# Patient Record
Sex: FEMALE | Race: AMERICAN INDIAN OR ALASKA NATIVE | NOT HISPANIC OR LATINO
[De-identification: names, ages, dates, MRNs, and addresses within clinical notes are randomized per-mention and may not be internally consistent; named-entity substitution may affect disease eponyms.]

---

## 2021-03-31 ENCOUNTER — APPOINTMENT (OUTPATIENT)
Dept: SURGICAL ONCOLOGY | Facility: CLINIC | Age: 51
End: 2021-03-31
Payer: MEDICARE

## 2021-03-31 VITALS
WEIGHT: 139 LBS | HEART RATE: 86 BPM | SYSTOLIC BLOOD PRESSURE: 204 MMHG | DIASTOLIC BLOOD PRESSURE: 122 MMHG | HEIGHT: 60 IN | OXYGEN SATURATION: 98 % | BODY MASS INDEX: 27.29 KG/M2 | RESPIRATION RATE: 16 BRPM

## 2021-03-31 DIAGNOSIS — N64.59 OTHER SIGNS AND SYMPTOMS IN BREAST: ICD-10-CM

## 2021-03-31 PROCEDURE — 99072 ADDL SUPL MATRL&STAF TM PHE: CPT

## 2021-03-31 PROCEDURE — 99205 OFFICE O/P NEW HI 60 MIN: CPT

## 2021-03-31 RX ORDER — LATANOPROST/PF 0.005 %
0.01 DROPS OPHTHALMIC (EYE)
Qty: 5 | Refills: 0 | Status: ACTIVE | COMMUNITY
Start: 2021-02-10

## 2021-03-31 RX ORDER — MECLIZINE HYDROCHLORIDE 25 MG/1
25 TABLET ORAL
Qty: 90 | Refills: 0 | Status: ACTIVE | COMMUNITY
Start: 2021-02-10

## 2021-03-31 RX ORDER — ATORVASTATIN CALCIUM 20 MG/1
20 TABLET, FILM COATED ORAL
Qty: 30 | Refills: 0 | Status: ACTIVE | COMMUNITY
Start: 2021-03-09

## 2021-03-31 RX ORDER — CYCLOBENZAPRINE HYDROCHLORIDE 10 MG/1
10 TABLET, FILM COATED ORAL
Qty: 30 | Refills: 0 | Status: ACTIVE | COMMUNITY
Start: 2020-12-16

## 2021-03-31 RX ORDER — METOPROLOL SUCCINATE 50 MG/1
50 TABLET, EXTENDED RELEASE ORAL
Qty: 30 | Refills: 0 | Status: ACTIVE | COMMUNITY
Start: 2020-10-09

## 2021-06-15 NOTE — HISTORY OF PRESENT ILLNESS
[de-identified] : Last seen 2014 for annual breast examination.\par No follow-up since.\par \par 50-year-old lady referred by her internist (Justin SADLER) for breast examination.\par \par Today, she reports bilateral, diffuse, fairly significant mastalgia.\par Her symptoms are fairly constant.\par No provocative or palliative factors.\par \par No other signs or symptoms related to either breast.\par \par When seen initially, she had reported that since at least the summer  she has had:\par Bilateral, multiduct, spontaneous, nonbloody nipple discharge.\par She has had accompanying, poorly localized, intermittent (noncyclical) bilateral mastalgia.\par \par My examination, and subsequent imaging, identified no worrisome findings.\par \par \par \par CC (presently):\par Since 2021:\par Bilateral breast lumps, and significant, diffuse, bilateral breast pain\par \par No preceding injury or strain.\par No other signs or symptoms related to either breast.\par \par Possible contributing factors for her mastalgia:\par At least 4 caffeinated beverages daily.\par Non-smoker.\par No exogenous hormones.\par \par 3/10/2021:\par Bilateral mammogram and sonogram at [facility]: BI-RADS 0.\par Bilateral targeted breast ultrasound (upper palpable masses) recommended.\par She has not gone for the studies yet.\par Prescription provided.\par \par \par +Personal history:\par 2004:\par Excisional left benign breast biopsy.\par No other procedures related to either breast.\par \par + FH:\par Maternal aunt: Breast cancer at 48.\par A maternal cousin also had breast cancer.\par \par Maternal aunt with ovarian cancer in her 40s.\par A maternal great aunt also had ovarian cancer.\par \par She has some distant relatives with "stomach cancer".\par \par Maternal cousin with leukemia.\par \par \par Menarche at 10.\par  3, para 1, first at 23.\par \par \par Her breast cancer risk score is 25.2.\par \par \par PMD: Dr. Justin SADLER.\par \par No pacemaker or defibrillator.\par No anticoagulants.\par \par + Hypertension, treated with metoprolol.\par Atorvastatin for hyperlipidemia..\par She has not had to see a cardiologist\par \par + GERD.\par Symptoms treated with ranitidine.\par Sodium docusate for constipation.\par \par + Vertigo, symptoms treated with meclizine.\par + "Numbness" treated with methocarbamol.\par \par Glaucoma for which she uses Lumigan eyedrops.\par Ophthalmology: Dr. Jas CATHERINE\par \par \par She has an appointment with her gynecologist in 2021.\par She does not have a specific name.\par She goes to the [facility] in [place].\par \par \par  colonoscopy at [facility] was unremarkable.

## 2021-06-15 NOTE — ASSESSMENT
[FreeTextEntry1] : Regarding her breast pain and tenderness:\par I have made some suggestions for conservative efforts for symptom relief.\par I have urged her to to go for the recommended bilateral breast ultrasounds, and gave her a prescription for [facility].\par I also suggested a baseline breast MRI (breast cancer risk score = 25.2).\par She understands and agrees, prescription provided for the same facility.\par \par 3/10/2021:\par Bilateral mammogram and sonogram at [facility]: BI-RADS 0.\par Bilateral targeted breast ultrasound (upper palpable masses) recommended.\par \par \par Regarding her recent weight loss, fatigue, and malaise.\par I suggested:\par Follow-up with her internist, who she had seen last week for a baseline evaluation.\par I also suggested she mention her symptoms to her gynecologist when she goes in April 2021.\par \par Reviewed in detail, all questions answered.\par \par I have asked to see her in 4 to 6 months, sooner if needed.\par \par Note dictated\par \par \par 5/28/2021:\par Baseline bilateral breast MRI at [facility]: BI-RADS 3.\par 6-month follow-up recommended for November 2021.\par Prescription mailed 6-7-21.\par \par Regarding the recommended supplemental bilateral breast ultrasounds....................\par \par \par 6/15/2021:\par I returned her call.\par No answer.\par Mailbox full.

## 2021-06-15 NOTE — REVIEW OF SYSTEMS
[Negative] : Heme/Lymph [FreeTextEntry3] : Glaucoma [FreeTextEntry4] : Dizziness [FreeTextEntry5] : Hypertension [de-identified] : Numbness [de-identified] : Nipple discharge

## 2021-06-15 NOTE — REASON FOR VISIT
[Initial Consultation] : an initial consultation for [Other: _____] : [unfilled] [FreeTextEntry2] : Bilateral mastalgia

## 2021-06-15 NOTE — PHYSICAL EXAM
[Normal] : supple, no neck mass and thyroid not enlarged [Normal Neck Lymph Nodes] : normal neck lymph nodes  [Normal Supraclavicular Lymph Nodes] : normal supraclavicular lymph nodes [Normal Axillary Lymph Nodes] : normal axillary lymph nodes [Normal] : normal appearance, no rash, nodules, vesicles, ulcers, erythema [de-identified] : Below [de-identified] : Groins not examined

## 2021-09-26 PROBLEM — N64.4 MASTALGIA: Status: ACTIVE | Noted: 2021-09-26

## 2021-09-27 ENCOUNTER — APPOINTMENT (OUTPATIENT)
Dept: SURGICAL ONCOLOGY | Facility: CLINIC | Age: 51
End: 2021-09-27

## 2021-09-27 DIAGNOSIS — N64.4 MASTODYNIA: ICD-10-CM

## 2023-01-09 NOTE — ASSESSMENT
[FreeTextEntry1] : 9/27/2021, she did not keep her appointment for follow-up of bilateral mastalgia\par \par \par (Regarding her breast pain and tenderness:\par I made some suggestions for conservative efforts for symptom relief.\par I urged her to to go for the recommended bilateral breast ultrasounds, and gave her a prescription for [facility].\par I also suggested a baseline breast MRI (breast cancer risk score = 25.2).\par She understands and agrees, prescription provided for the same facility.\par \par 3/10/2021:\par Bilateral mammogram and sonogram at [facility]: BI-RADS 0.\par Bilateral targeted breast ultrasound (upper palpable masses) recommended.\par \par \par \par 5/28/2021:\par Baseline bilateral breast MRI at [facility]: BI-RADS 3.\par 6-month follow-up recommended for November 2021.\par Prescription mailed 6-7-21.\par \par \par 12/19/2022.\par I called.  I spoke.\par December 8, 2022, she had the following procedures, and results, from [facility]:\par 1.  Left breast (12:00) sonogram guided core needle biopsy:\par Benign and concordant.\par 6-month follow-up left breast ultrasound recommended.\par 2.  Right breast (retroareolar) MRI core biopsy:\par Benign and concordant.\par 6-month follow-up MRI recommended.\par \par Jackie will send her prescriptions for the procedures for June 2023. \par \par Regarding her recent weight loss, fatigue, and malaise.\par I suggested:\par Follow-up with her internist, who she had seen last week for a baseline evaluation.\par I also suggested she mention her symptoms to her gynecologist when she goes in April 2021.\par \par \par Regarding the recommended supplemental bilateral breast ultrasounds....................) \par \par \par \par 11/22/2022.\par We spoke.\par Earlier this month she had her bilateral mammogram and sonogram at [facility]: BI-RADS 0.\par MRI recommended (6-month follow-up).\par Subsequent bilateral breast MRI demonstrates:\par 1.  A right breast retroareolar nodule for which an MRI core biopsy is recommended.\par 2.  Left breast (upper) abnormality for which a sonogram guided core biopsy is recommended.\par \par Jackie will send her prescriptions.\par When the patient receives the paperwork, she will call [facility] to schedule her procedures.\par \par ***ALSO, she reports a 3-month history of intermittent, asymptomatic, sometimes spontaneous, other times only on exam, bilateral bloody appearing nipple discharge.\par \par I will see her for a follow-up visit regarding this interval change in physical exam.\par \par \par 12/8/2022:\par Right breast MRI core biopsy at [facility]:\par Fibrocystic change with calcifications.\par \par Left breast (12:00) sonogram guided core biopsy at [facility]:\par Fibroadenoma.\par \par Both benign results are concordant.\par 6-month follow-up breast MRI recommended for June 2023.\par Prescription mailed to her on 01/09/2023.\par

## 2023-01-09 NOTE — HISTORY OF PRESENT ILLNESS
[de-identified] : 50-year-old lady returning for scheduled follow-up of bilateral mastalgia.\par \par Index visit was 2021.\par Her symptoms were constant, bilateral, diffuse, and fairly significant.\par \par My PE:\par Bilateral tenderness.\par No other discrete visible or palpable findings in either breast.\par \par + Elevated breast cancer risk score (25.2, below).\par \par May 2021:\par Baseline breast MRI at [facility]: BI-RADS 3.\par I had mailed her a prescription for her 6-month follow-up MRI 2021 on 2021.\par \par She returns for scheduled follow-up today.\par \par \par 2021, she was referred by her internist (Justin SADLER) for breast examination.\par \par She had bilateral, diffuse, fairly significant mastalgia.\par Her symptoms were fairly constant.\par No provocative or palliative factors.\par \par No other signs or symptoms related to either breast.\par \par \par She was seen initially in May 2005.\par At that time she reported that since the summer 2005 she had:\par Bilateral, multi-duct, spontaneous, nonbloody nipple discharge.\par She had accompanying, poorly localized, intermittent (non-cyclical) bilateral mastalgia.\par \par My examination, and subsequent imaging, identified no worrisome findings.\par \par \par \par 2021:\par Since 2021:\par Bilateral breast lumps, and significant, diffuse, bilateral breast pain\par \par No preceding injury or strain.\par No other signs or symptoms related to either breast.\par \par Possible contributing factors for her mastalgia:\par At least 4 caffeinated beverages daily.\par Non-smoker.\par No exogenous hormones.\par \par 3/10/2021:\par Bilateral mammogram and sonogram at [facility]: BI-RADS 0.\par Bilateral targeted breast ultrasound (upper palpable masses) recommended.\par At her 2021 visit I provided her with prescription for follow-up studies...............\par \par \par +Personal history:\par 2004:\par Excisional left benign breast biopsy.\par No other procedures related to either breast.\par \par + FH:\par Maternal aunt: Breast cancer at 48.\par A maternal cousin also had breast cancer.\par \par Maternal aunt with ovarian cancer in her 40s.\par A maternal great aunt also had ovarian cancer.\par \par She has some distant relatives with "stomach cancer".\par \par Maternal cousin with leukemia.\par \par \par Menarche at 10.\par  3, para 1, first at 23.\par \par \par Her breast cancer risk score is 25.2.\par \par \par PMD: Dr. Justin SADLER.\par \par No pacemaker or defibrillator.\par No anticoagulants.\par \par + Hypertension, treated with metoprolol.\par Atorvastatin for hyperlipidemia..\par She has not had to see a cardiologist\par \par + GERD.\par Symptoms treated with ranitidine.\par Sodium docusate for constipation.\par \par + Vertigo, symptoms treated with meclizine.\par + "Numbness" treated with methocarbamol.\par \par Glaucoma for which she uses Lumigan eyedrops.\par Ophthalmology: Dr. Jas CATHERINE\par \par \par She has an appointment with her gynecologist in 2021.\par She does not have a specific name.\par She goes to the [facility] in [place].\par \par \par  colonoscopy at [facility] was unremarkable.

## 2023-01-09 NOTE — REASON FOR VISIT
[Other: _____] : [unfilled] [Melanoma] : melanoma [FreeTextEntry2] : 9/27/2021, she did not keep her appointment for follow-up of bilateral mastalgia

## 2023-01-09 NOTE — REVIEW OF SYSTEMS
[Negative] : Integumentary [FreeTextEntry3] : Glaucoma [FreeTextEntry5] : Hypertension [FreeTextEntry8] : Heartburn [de-identified] : Vertigo [FreeTextEntry1] : Increased risk of breast cancer

## 2023-01-09 NOTE — PHYSICAL EXAM
[Normal] : supple, no neck mass and thyroid not enlarged [Normal Neck Lymph Nodes] : normal neck lymph nodes  [Normal Supraclavicular Lymph Nodes] : normal supraclavicular lymph nodes [Normal Axillary Lymph Nodes] : normal axillary lymph nodes [Normal] : normal appearance, no rash, nodules, vesicles, ulcers, erythema [de-identified] : Groins not examined [de-identified] : Below

## 2024-10-03 ENCOUNTER — APPOINTMENT (OUTPATIENT)
Dept: SURGICAL ONCOLOGY | Facility: CLINIC | Age: 54
End: 2024-10-03

## 2024-10-03 DIAGNOSIS — Z91.89 OTHER SPECIFIED PERSONAL RISK FACTORS, NOT ELSEWHERE CLASSIFIED: ICD-10-CM

## 2024-10-03 NOTE — REVIEW OF SYSTEMS
[Negative] : Integumentary [FreeTextEntry3] : Glaucoma [FreeTextEntry4] : Vertigo [FreeTextEntry5] : Hypertension [FreeTextEntry8] : Reflux [de-identified] : Nipple discharge and mastalgia [FreeTextEntry1] : Increased risk of breast cancer

## 2024-10-03 NOTE — PHYSICAL EXAM
[Normal] : supple, no neck mass and thyroid not enlarged [Normal Neck Lymph Nodes] : normal neck lymph nodes  [Normal Supraclavicular Lymph Nodes] : normal supraclavicular lymph nodes [Normal Axillary Lymph Nodes] : normal axillary lymph nodes [Normal] : normal appearance, no rash, nodules, vesicles, ulcers, erythema [de-identified] : Groins not examined [de-identified] : Below

## 2024-10-03 NOTE — ASSESSMENT
[FreeTextEntry1] : 53-year-old lady.  Presents to reestablish regular breast examinations.  Breast cancer risk score = 25.2.   Breast imagin2024: Bilateral breast MRI at [facility]-: BI-RADS 0. Bilateral diagnostic mammogram and ultrasound recommended. I had mailed her a prescription on 2024.

## 2024-10-03 NOTE — HISTORY OF PRESENT ILLNESS
[de-identified] : 53-year-old lady.  Breast cancer risk score = 35.2.   CC:   She last saw me 3/31/2021.  We have followed her for intermittent breast examinations since May 2005.   + Prior personal history: + Bilateral mult-iduct nipple discharge (intermittent). + Intermittent bilateral mastalgia.  2004: Surgical left breast biopsy was benign. 2022: Right breast (periareolar) MRI core biopsy at [facility]. Benign and concordant. 2022: Left breast (12:00) sonogram guided core needle biopsy: Fibroadenoma (concordant)   + FH: + Maternal aunt breast cancer at 48. + Maternal second cousin: Breast cancer.  + Maternal aunt with ovarian cancer in her 40s. Maternal great aunt: Ovarian cancer.  + Additional family history of malignancy: Maternal cousin: Leukemia. Distant relatives with "stomach cancer".   Menarche at 10.  3, para 1 at 23.   PMD: Dr. Justin SADLER.  No pacemaker or defibrillator. No anticoagulants.  + Hypertension, treated with metoprolol. Atorvastatin for hyperlipidemia.. She has not had to see a cardiologist  + GERD. Symptoms treated with ranitidine. Sodium docusate for constipation.  + Vertigo, symptoms treated with meclizine. + "Numbness" treated with methocarbamol.  Glaucoma for which she uses Lumigan eyedrops. Ophthalmology: Dr. Jas CATHERINE   She has an appointment with her gynecologist in 2021. She does not have a specific name. She goes to the [facility] in [place].   2020 colonoscopy at [facility] was unremarkable.

## 2024-10-31 ENCOUNTER — NON-APPOINTMENT (OUTPATIENT)
Age: 54
End: 2024-10-31

## 2024-10-31 ENCOUNTER — APPOINTMENT (OUTPATIENT)
Dept: SURGICAL ONCOLOGY | Facility: CLINIC | Age: 54
End: 2024-10-31
Payer: MEDICARE

## 2024-10-31 VITALS
BODY MASS INDEX: 24.83 KG/M2 | RESPIRATION RATE: 17 BRPM | DIASTOLIC BLOOD PRESSURE: 106 MMHG | HEIGHT: 65 IN | SYSTOLIC BLOOD PRESSURE: 170 MMHG | OXYGEN SATURATION: 98 % | WEIGHT: 149 LBS | HEART RATE: 90 BPM

## 2024-10-31 DIAGNOSIS — Z91.89 OTHER SPECIFIED PERSONAL RISK FACTORS, NOT ELSEWHERE CLASSIFIED: ICD-10-CM

## 2024-10-31 PROCEDURE — 99204 OFFICE O/P NEW MOD 45 MIN: CPT
